# Patient Record
Sex: FEMALE | Race: WHITE | NOT HISPANIC OR LATINO | Employment: FULL TIME | URBAN - METROPOLITAN AREA
[De-identification: names, ages, dates, MRNs, and addresses within clinical notes are randomized per-mention and may not be internally consistent; named-entity substitution may affect disease eponyms.]

---

## 2021-07-29 PROCEDURE — 96374 THER/PROPH/DIAG INJ IV PUSH: CPT

## 2021-07-29 PROCEDURE — 96361 HYDRATE IV INFUSION ADD-ON: CPT

## 2021-07-29 PROCEDURE — 96375 TX/PRO/DX INJ NEW DRUG ADDON: CPT

## 2021-07-29 PROCEDURE — 99284 EMERGENCY DEPT VISIT MOD MDM: CPT | Mod: 25

## 2021-07-30 ENCOUNTER — HOSPITAL ENCOUNTER (EMERGENCY)
Facility: CLINIC | Age: 41
Discharge: HOME | End: 2021-07-30
Attending: EMERGENCY MEDICINE | Admitting: EMERGENCY MEDICINE
Payer: COMMERCIAL

## 2021-07-30 ENCOUNTER — APPOINTMENT (OUTPATIENT)
Dept: CT IMAGING | Facility: CLINIC | Age: 41
End: 2021-07-30
Attending: EMERGENCY MEDICINE
Payer: COMMERCIAL

## 2021-07-30 VITALS
RESPIRATION RATE: 16 BRPM | WEIGHT: 191 LBS | SYSTOLIC BLOOD PRESSURE: 145 MMHG | HEART RATE: 88 BPM | TEMPERATURE: 99 F | OXYGEN SATURATION: 97 % | DIASTOLIC BLOOD PRESSURE: 84 MMHG

## 2021-07-30 DIAGNOSIS — G43.001 MIGRAINE WITHOUT AURA AND WITH STATUS MIGRAINOSUS, NOT INTRACTABLE: ICD-10-CM

## 2021-07-30 LAB
ALBUMIN UR-MCNC: NEGATIVE MG/DL
APPEARANCE UR: CLEAR
BILIRUB UR QL STRIP: NEGATIVE
COLOR UR AUTO: ABNORMAL
GLUCOSE UR STRIP-MCNC: NEGATIVE MG/DL
HGB UR QL STRIP: NEGATIVE
KETONES UR STRIP-MCNC: 10 MG/DL
LEUKOCYTE ESTERASE UR QL STRIP: ABNORMAL
MUCOUS THREADS #/AREA URNS LPF: PRESENT /LPF
NITRATE UR QL: NEGATIVE
PH UR STRIP: 6.5 [PH] (ref 5–7)
RBC URINE: 0 /HPF
SP GR UR STRIP: 1.01 (ref 1–1.03)
SQUAMOUS EPITHELIAL: 12 /HPF
UROBILINOGEN UR STRIP-MCNC: <2 MG/DL
WBC URINE: 4 /HPF

## 2021-07-30 PROCEDURE — 70450 CT HEAD/BRAIN W/O DYE: CPT

## 2021-07-30 PROCEDURE — 81003 URINALYSIS AUTO W/O SCOPE: CPT | Performed by: EMERGENCY MEDICINE

## 2021-07-30 PROCEDURE — 258N000003 HC RX IP 258 OP 636: Performed by: EMERGENCY MEDICINE

## 2021-07-30 PROCEDURE — 250N000011 HC RX IP 250 OP 636: Performed by: EMERGENCY MEDICINE

## 2021-07-30 RX ORDER — KETOROLAC TROMETHAMINE 15 MG/ML
15 INJECTION, SOLUTION INTRAMUSCULAR; INTRAVENOUS ONCE
Status: COMPLETED | OUTPATIENT
Start: 2021-07-30 | End: 2021-07-30

## 2021-07-30 RX ORDER — DIPHENHYDRAMINE HYDROCHLORIDE 50 MG/ML
50 INJECTION INTRAMUSCULAR; INTRAVENOUS ONCE
Status: COMPLETED | OUTPATIENT
Start: 2021-07-30 | End: 2021-07-30

## 2021-07-30 RX ORDER — ONDANSETRON 4 MG/1
4-8 TABLET, ORALLY DISINTEGRATING ORAL EVERY 8 HOURS PRN
Qty: 20 TABLET | Refills: 0 | Status: SHIPPED | OUTPATIENT
Start: 2021-07-30 | End: 2021-08-02

## 2021-07-30 RX ADMIN — SODIUM CHLORIDE 1000 ML: 9 INJECTION, SOLUTION INTRAVENOUS at 00:36

## 2021-07-30 RX ADMIN — KETOROLAC TROMETHAMINE 15 MG: 15 INJECTION, SOLUTION INTRAMUSCULAR; INTRAVENOUS at 00:38

## 2021-07-30 RX ADMIN — PROCHLORPERAZINE EDISYLATE 10 MG: 5 INJECTION INTRAMUSCULAR; INTRAVENOUS at 00:40

## 2021-07-30 RX ADMIN — DIPHENHYDRAMINE HYDROCHLORIDE 50 MG: 50 INJECTION INTRAMUSCULAR; INTRAVENOUS at 00:39

## 2021-07-30 NOTE — ED TRIAGE NOTES
Pt presents with L sided migraine since Tuesday. Pt reports worsening symptoms since Tuesday. Pt has hx of migraines.  Pt reports nausea and light sensitivity. ABCs intact.

## 2021-07-30 NOTE — DISCHARGE INSTRUCTIONS
Follow-up with your primary care doctor within the next week for recheck  Follow-up with neurology in the next 1 to 2 weeks for further care and evaluation of your worsening headaches  Tylenol 650 mg every 4 hours as needed for pain  Ibuprofen 600 mg every 6 hours as needed for pain  Return to the emergency department for worsening problems or concerns

## 2021-07-30 NOTE — ED NOTES
Patient complains of migraine x 2 days with nausea. HX of migraine and used PRN imitrex without relief.

## 2021-07-30 NOTE — ED PROVIDER NOTES
EMERGENCY DEPARTMENT ENCOUnter      NAME: Bess Gabriel  AGE: 41 year old female  YOB: 1980  MRN: [id number]  EVALUATION DATE & TIME: 2021 12:04 AM    PCP: No primary care provider on file.    ED PROVIDER: Sunni Spangler MD      Chief Complaint   Patient presents with     Headache         FINAL IMPRESSION:  1. Migraine without aura and with status migrainosus, not intractable          ED COURSE & MEDICAL DECISION MAKIN Introduced myself to patient, gathered patient history, and performed an initial exam.  Normal saline 1 L IV was administered for IV hydration.  Toradol 15 mg, Benadryl 50 mg, and Compazine 10 mg IV was administered for treatment of her migraine  0208 Rechecked patient. Patient reports improvement in symptoms.  She has no evidence of intracranial abnormalities on her CT.  0218 Updated patient on her results. I discussed the plan for discharge with the patient, and patient is agreeable. We discussed supportive cares at home and reasons for return to the ER including new or worsening symptoms - all questions and concerns addressed. Patient to be discharged by RN.      At the conclusion of the encounter I discussed the results of all of the tests and the disposition. The questions were answered. The patient or family acknowledged understanding and was agreeable with the care plan.         MEDICATIONS GIVEN IN THE EMERGENCY:  Medications   0.9% sodium chloride BOLUS (0 mLs Intravenous Stopped 21 0215)   ketorolac (TORADOL) injection 15 mg (15 mg Intravenous Given 21 0038)   diphenhydrAMINE (BENADRYL) injection 50 mg (50 mg Intravenous Given 21 0039)   prochlorperazine (COMPAZINE) injection 10 mg (10 mg Intravenous Given 21 0040)       NEW PRESCRIPTIONS STARTED AT TODAY'S ER VISIT  New Prescriptions    No medications on file          =================================================================    HPI        Bess Gabriel is  a 41 year old female with a pertinent history of migraines (per patient) who presents to this ED by walk in for evaluation of headache. Patient presents with a migraine episode for the past 48 hours. Headache initially began on the right side of her face and has gradually wrapped itself around the right side of her head. Patient reports that she developed nausea, vomiting, and generalized weakness 8 hours ago. She states that she has been unable to hold anything down and as a consequence is dehydrated. Patient notes that she has history of migraines that usually dissipate in severity, but states that her current episode has not. She notes Imitrex, Tylenol, and Advil do not help with pain. She has not been seen by Neurology for this. Patient is otherwise healthy and is fully vaccinated against COVID-19.  Patient denies recent injuries, tick bites, sick exposures, fever, chills, cough, or any additional complaints at this time.      REVIEW OF SYSTEMS     Constitutional:  Denies fever or chills  HENT:  Denies sore throat   Respiratory:  Denies cough or shortness of breath   Cardiovascular:  Denies chest pain or palpitations  GI:  Denies abdominal pain. Endorses nausea and vomiting.   Musculoskeletal:  Denies any new extremity pain   Skin:  Denies rash   Neurologic:  Denies or sensory changes. Endorses headache and generalized weakness  All other systems reviewed and are negative      PAST MEDICAL HISTORY:  History reviewed. No pertinent past medical history.    PAST SURGICAL HISTORY:  History reviewed. No pertinent surgical history.        CURRENT MEDICATIONS:    No current outpatient medications on file.      ALLERGIES:  Allergies   Allergen Reactions     Amoxicillin Swelling       FAMILY HISTORY:  History reviewed. No pertinent family history.    SOCIAL HISTORY:   Social History     Socioeconomic History     Marital status:      Spouse name: None     Number of children: None     Years of education: None      Highest education level: None   Occupational History     None   Tobacco Use     Smoking status: None   Substance and Sexual Activity     Alcohol use: None     Drug use: None     Sexual activity: None   Other Topics Concern     None   Social History Narrative     None     Social Determinants of Health     Financial Resource Strain:      Difficulty of Paying Living Expenses:    Food Insecurity:      Worried About Running Out of Food in the Last Year:      Ran Out of Food in the Last Year:    Transportation Needs:      Lack of Transportation (Medical):      Lack of Transportation (Non-Medical):    Physical Activity:      Days of Exercise per Week:      Minutes of Exercise per Session:    Stress:      Feeling of Stress :    Social Connections:      Frequency of Communication with Friends and Family:      Frequency of Social Gatherings with Friends and Family:      Attends [employer] Services:      Active Member of Clubs or Organizations:      Attends Club or Organization Meetings:      Marital Status:    Intimate Partner Violence:      Fear of Current or Ex-Partner:      Emotionally Abused:      Physically Abused:      Sexually Abused:        VITALS:  Patient Vitals for the past 24 hrs:   BP Temp Temp src Pulse Resp SpO2 Weight   07/30/21 0033 (!) 142/89 -- -- 52 14 98 % --   07/29/21 2217 (!) 164/87 99  F (37.2  C) Oral 73 16 97 % 86.6 kg (191 lb)       PHYSICAL EXAM    Constitutional:  Well developed, Well nourished, mild distress  HENT:  Normocephalic, Atraumatic, Bilateral external ears normal, Oropharynx moist, Nose normal.   Neck:  Normal range of motion, No meningismus, No stridor.   Eyes:  EOMI, Conjunctiva normal, No discharge.   Respiratory:  Normal breath sounds, No respiratory distress, No wheezing, No chest tenderness.   Cardiovascular:  Normal heart rate, Normal rhythm, No murmurs  GI:  Soft, No tenderness, No guarding, No CVA tenderness.   Musculoskeletal:  Neurovascularly intact distally, No edema, No  tenderness, No cyanosis, Good range of motion in all major joints. No tenderness to palpation or major deformities noted.   Integument:  Warm, Dry, No erythema, No rash.   Lymphatic:  No lymphadenopathy noted.   Neurologic:  Alert & oriented x 3, Normal motor function, Normal sensory function, No focal deficits noted.   Psychiatric:  Affect normal, Judgment normal, Mood normal.      LAB:  Results for orders placed or performed during the hospital encounter of 07/30/21   Head CT w/o contrast    Impression    IMPRESSION:  1.  Normal head CT.   UA with Microscopic reflex to Culture    Specimen: Urine, Midstream   Result Value Ref Range    Color Urine Light Yellow Colorless, Straw, Light Yellow, Yellow    Appearance Urine Clear Clear    Glucose Urine Negative Negative mg/dL    Bilirubin Urine Negative Negative    Ketones Urine 10  (A) Negative mg/dL    Specific Gravity Urine 1.009 1.001 - 1.030    Blood Urine Negative Negative    pH Urine 6.5 5.0 - 7.0    Protein Albumin Urine Negative Negative mg/dL    Urobilinogen Urine <2.0 <2.0 mg/dL    Nitrite Urine Negative Negative    Leukocyte Esterase Urine 25 Dipesh/uL (A) Negative    Mucus Urine Present (A) None Seen /LPF    RBC Urine 0 <=2 /HPF    WBC Urine 4 <=5 /HPF    Squamous Epithelials Urine 12 (H) <=1 /HPF       RADIOLOGY:  Head CT w/o contrast    Result Date: 7/30/2021  EXAM: CT HEAD W/O CONTRAST LOCATION: [facility] DATE/TIME: 7/30/2021 1:28 AM INDICATION: Headache, classic migraine COMPARISON: None. TECHNIQUE: Routine CT Head without IV contrast. Multiplanar reformats. Dose reduction techniques were used. FINDINGS: INTRACRANIAL CONTENTS: No intracranial hemorrhage, extraaxial collection, or mass effect.  No CT evidence of acute infarct. Normal parenchymal attenuation. Normal ventricles and sulci. VISUALIZED ORBITS/SINUSES/MASTOIDS: No intraorbital abnormality. No paranasal sinus mucosal disease. No middle ear or mastoid effusion. BONES/SOFT  TISSUES: No acute abnormality.     IMPRESSION: 1.  Normal head CT.    I, Zi Bar, am serving as a scribe to document services personally performed by Dr. Spangler based on my observation and the provider's statements to me. I, Sunni Spangler MD attest that Zi Bar is acting in a scribe capacity, has observed my performance of the services and has documented them in accordance with my direction.    Sunni Spangler MD  Emergency Medicine  [facility] EMERGENCY ROOM  [address] [address]  [phone]  Dept: [phone]     Sunni Spangler MD  07/30/21 0021

## 2021-10-19 ENCOUNTER — OFFICE VISIT (OUTPATIENT)
Dept: NEUROLOGY | Facility: CLINIC | Age: 41
End: 2021-10-19
Attending: EMERGENCY MEDICINE
Payer: COMMERCIAL

## 2021-10-19 VITALS
HEART RATE: 54 BPM | DIASTOLIC BLOOD PRESSURE: 82 MMHG | SYSTOLIC BLOOD PRESSURE: 145 MMHG | HEIGHT: 62 IN | BODY MASS INDEX: 36.62 KG/M2 | WEIGHT: 199 LBS

## 2021-10-19 DIAGNOSIS — G43.709 CHRONIC MIGRAINE WITHOUT AURA WITHOUT STATUS MIGRAINOSUS, NOT INTRACTABLE: Primary | ICD-10-CM

## 2021-10-19 PROCEDURE — 99205 OFFICE O/P NEW HI 60 MIN: CPT | Performed by: PSYCHIATRY & NEUROLOGY

## 2021-10-19 RX ORDER — ONDANSETRON 8 MG/1
8 TABLET, ORALLY DISINTEGRATING ORAL PRN
COMMUNITY
Start: 2021-08-26

## 2021-10-19 RX ORDER — METOCLOPRAMIDE 5 MG/1
TABLET ORAL
Qty: 20 TABLET | Refills: 3 | Status: SHIPPED | OUTPATIENT
Start: 2021-10-19

## 2021-10-19 RX ORDER — PROPRANOLOL HCL 60 MG
60 CAPSULE, EXTENDED RELEASE 24HR ORAL DAILY
COMMUNITY
Start: 2021-08-26 | End: 2022-01-25

## 2021-10-19 RX ORDER — RIZATRIPTAN BENZOATE 10 MG/1
10 TABLET ORAL
Qty: 9 TABLET | Refills: 11 | Status: SHIPPED | OUTPATIENT
Start: 2021-10-19 | End: 2022-01-25

## 2021-10-19 RX ORDER — SUMATRIPTAN 20 MG/1
20 SPRAY NASAL PRN
COMMUNITY
Start: 2021-08-26 | End: 2021-10-19

## 2021-10-19 ASSESSMENT — MIFFLIN-ST. JEOR: SCORE: 1530.91

## 2021-10-19 NOTE — PROGRESS NOTES
In person evaluation    HPI  10/19/2021, in person consultation      41-year-old being evaluated neurologically for:  Migraine headaches  Onset around age 31    Frequency at least 6/month  Duration 4 hours to 3 days  A lot of pre headache type symptoms prior to the headache and afterwards with a hangover effect    Had 1 headache with significant nasal runny nose and tearing of the eye but that was only 1 time  Positive history of motion sickness and carsickness  Paternal aunt with migraine  Sister with migraine    Associated symptoms  Photophobia positive  Phonophobia positive  No visual changes  Nausea severe   Vomiting severe  Diarrhea associated with migraines  Headache onset unilateral above the eye either side  Throbbing painful headache builds in nature gradually    Has not used preventative medications yet was prescribed propranolol    Medications   Imitrex nasal spray and pills  Amerge  Zofran  Migraine cocktail at the ER        Past medical history  Severe migraine headaches onset age 31 after the birth of her first child  Hypertension  Anxiety/depression 2008      Habits  Past smoker  Alcohol 1/week    Family history  Grandfather with stroke and heart disease and diabetes  Maternal aunt with migraines  Cousins with migraines  Sister with migraines  Mother with high blood pressure high cholesterol and arthritis  Father with high cholesterol and arthritis    Work-up  TSH 0.72 (August 2, 2021)  Hemoglobin A1c 5.1% (8/2/2021)  CT scan head 7/30/2021 normal      Exam    Review of systems  Does have some back pain  Some difficulty with sleeping  Has had palpitations    Currently  No diplopia dysarthria dysphagia  No visual changes  No focal weakness numbness or tingling    When she has her headaches has symptoms and signs as outlined above  Severe nausea and vomiting with headaches  Photophobia phonophobia with headache    Otherwise review systems negative also see intake sheet    General exam  Blood pressure  145/82, pulse 54, temperature 99.0  HEENT normal  Lungs clear  Heart rate regular  Abdomen soft  Symmetrical pulses  No edema feet    Neurologic exam  Alert oriented 3  Normal prosody speech  Normal naming  Normal comprehension  Normal repetition  No aphasia  No neglect  Memory recall normal    Cranial 2 through 12 normal  Optic fundi good no papilledema  Pupils equal round reactive to light  No ophthalmoplegia  No nystagmus  Visual fields intact  Face symmetrical  Tongue twisters good    Upper extremities  No drift no tremor normal finger-nose    Lower extremities  Distal proximal strength good    Gait  Normal    Assessment/plan    1.  Severe migraine headaches        Frequency at least 6/month       Duration 4 hours to 3 days       Severe associated symptoms, intractable nausea vomiting photophobia and phonophobia no significant visual changes       Rare single headache with nasal running and tearing       Positive family history of migraines in sister and maternal aunt       History of motion sickness    2.  Hypertension    Discussed the difference between abortive and preventative medications  Discussed the side effects and benefits of different meds  She maybe has used Imitrex nasal spray and tablets and possibly Amerge    Due to the severity of her headaches with severe nausea vomiting and diarrhea difficulty use medications to break the headache if they get into late needs to be admitted to medication  Also discussed good headache hygiene with proper nutrition hydration sleep cycle and avoiding triggers  Not planning on a pregnancy    Plan  Propranolol 60 mg LA 1 tablet at nighttime primary as prescribed she has not started yet recommend trying it may take 6 weeks before it starts to work  Maxalt 10 mg tablet as the abortive therapy has not tried this may not work right away needs to be on a preventative  Reglan 5 mg tablet rescue tablet taken earlier in the headache down should not drive to decrease the  potential for gastroparesis with nausea and vomiting which prevents her medicines from working    Follow-up in 3 months    62 minutes total care time today discussing and evaluating the above    As part of the work-up today  Reviewed prior MD note 8/26/2021  Reviewed ER note 7/30/2021  Reviewed laboratory data  Reviewed CT scan head July 2021

## 2021-10-19 NOTE — LETTER
10/19/2021         RE: Bess Gabriel  [address] [address] [address]        Dear Colleague,    Thank you for referring your patient, Bess Gabriel, to the [facility] [place]. Please see a copy of my visit note below.    In person evaluation    HPI  10/19/2021, in person consultation      41-year-old being evaluated neurologically for:  Migraine headaches  Onset around age 31    Frequency at least 6/month  Duration 4 hours to 3 days  A lot of pre headache type symptoms prior to the headache and afterwards with a hangover effect    Had 1 headache with significant nasal runny nose and tearing of the eye but that was only 1 time  Positive history of motion sickness and carsickness  Paternal aunt with migraine  Sister with migraine    Associated symptoms  Photophobia positive  Phonophobia positive  No visual changes  Nausea severe   Vomiting severe  Diarrhea associated with migraines  Headache onset unilateral above the eye either side  Throbbing painful headache builds in nature gradually    Has not used preventative medications yet was prescribed propranolol    Medications   Imitrex nasal spray and pills  Amerge  Zofran  Migraine cocktail at the ER        Past medical history  Severe migraine headaches onset age 31 after the birth of her first child  Hypertension  Anxiety/depression 2008      Habits  Past smoker  Alcohol 1/week    Family history  Grandfather with stroke and heart disease and diabetes  Maternal aunt with migraines  Cousins with migraines  Sister with migraines  Mother with high blood pressure high cholesterol and arthritis  Father with high cholesterol and arthritis    Work-up  TSH 0.72 (August 2, 2021)  Hemoglobin A1c 5.1% (8/2/2021)  CT scan head 7/30/2021 normal      Exam    Review of systems  Does have some back pain  Some difficulty with sleeping  Has had palpitations    Currently  No diplopia dysarthria dysphagia  No visual changes  No  focal weakness numbness or tingling    When she has her headaches has symptoms and signs as outlined above  Severe nausea and vomiting with headaches  Photophobia phonophobia with headache    Otherwise review systems negative also see intake sheet    General exam  Blood pressure 145/82, pulse 54, temperature 99.0  HEENT normal  Lungs clear  Heart rate regular  Abdomen soft  Symmetrical pulses  No edema feet    Neurologic exam  Alert oriented 3  Normal prosody speech  Normal naming  Normal comprehension  Normal repetition  No aphasia  No neglect  Memory recall normal    Cranial 2 through 12 normal  Optic fundi good no papilledema  Pupils equal round reactive to light  No ophthalmoplegia  No nystagmus  Visual fields intact  Face symmetrical  Tongue twisters good    Upper extremities  No drift no tremor normal finger-nose    Lower extremities  Distal proximal strength good    Gait  Normal    Assessment/plan    1.  Severe migraine headaches        Frequency at least 6/month       Duration 4 hours to 3 days       Severe associated symptoms, intractable nausea vomiting photophobia and phonophobia no significant visual changes       Rare single headache with nasal running and tearing       Positive family history of migraines in sister and maternal aunt       History of motion sickness    2.  Hypertension    Discussed the difference between abortive and preventative medications  Discussed the side effects and benefits of different meds  She maybe has used Imitrex nasal spray and tablets and possibly Amerge    Due to the severity of her headaches with severe nausea vomiting and diarrhea difficulty use medications to break the headache if they get into late needs to be admitted to medication  Also discussed good headache hygiene with proper nutrition hydration sleep cycle and avoiding triggers  Not planning on a pregnancy    Plan  Propranolol 60 mg LA 1 tablet at nighttime primary as prescribed she has not started yet  recommend trying it may take 6 weeks before it starts to work  Maxalt 10 mg tablet as the abortive therapy has not tried this may not work right away needs to be on a preventative  Reglan 5 mg tablet rescue tablet taken earlier in the headache down should not drive to decrease the potential for gastroparesis with nausea and vomiting which prevents her medicines from working    Follow-up in 3 months    62 minutes total care time today discussing and evaluating the above    As part of the work-up today  Reviewed prior MD note 8/26/2021  Reviewed ER note 7/30/2021  Reviewed laboratory data  Reviewed CT scan head July 2021        Again, thank you for allowing me to participate in the care of your patient.        Sincerely,        Jose Angel Garcia MD

## 2021-10-19 NOTE — NURSING NOTE
Chief Complaint   Patient presents with     Migraine     Pt has had mmigraines since she was in her 20's. Migraines have now worsened. Averaging 1-2 per month. Sumatriptan does not help.     Ivon Kidd LPN on 10/19/2021 at 10:15 AM

## 2022-01-25 ENCOUNTER — OFFICE VISIT (OUTPATIENT)
Dept: NEUROLOGY | Facility: CLINIC | Age: 42
End: 2022-01-25
Payer: COMMERCIAL

## 2022-01-25 VITALS
DIASTOLIC BLOOD PRESSURE: 78 MMHG | HEIGHT: 62 IN | SYSTOLIC BLOOD PRESSURE: 126 MMHG | HEART RATE: 67 BPM | BODY MASS INDEX: 36.07 KG/M2 | WEIGHT: 196 LBS

## 2022-01-25 DIAGNOSIS — G43.709 CHRONIC MIGRAINE WITHOUT AURA WITHOUT STATUS MIGRAINOSUS, NOT INTRACTABLE: Primary | ICD-10-CM

## 2022-01-25 PROCEDURE — 99214 OFFICE O/P EST MOD 30 MIN: CPT | Performed by: PSYCHIATRY & NEUROLOGY

## 2022-01-25 RX ORDER — RIZATRIPTAN BENZOATE 10 MG/1
10 TABLET ORAL
Qty: 9 TABLET | Refills: 11 | Status: SHIPPED | OUTPATIENT
Start: 2022-01-25

## 2022-01-25 RX ORDER — PROPRANOLOL HCL 60 MG
60 CAPSULE, EXTENDED RELEASE 24HR ORAL DAILY
Qty: 30 CAPSULE | Refills: 11 | Status: SHIPPED | OUTPATIENT
Start: 2022-01-25

## 2022-01-25 ASSESSMENT — MIFFLIN-ST. JEOR: SCORE: 1517.3

## 2022-01-25 NOTE — LETTER
"    1/25/2022         RE: Bess Gabriel  [id number] [facility] [address]        Dear Colleague,    Thank you for referring your patient, Bess Gabriel, to the [facility] [place]. Please see a copy of my visit note below.    In person evaluation    HPI  10/19/2021, in person consultation  1/25/2022, in person visit      41-year-old being evaluated neurologically for:  Migraine headaches  Onset around age 31      A.  Migraine headaches       Onset of migraines around age 31        Headache onset unilateral above the eye either side        Throbbing painful headache builds in nature gradually    Frequency of headache  1/month or every other month  Duration 1-2-3 days if you count the presymptoms and \"hangover effect\"  Significant nausea and dry heaves with a headache    Patient did not try the propranolol as her blood pressure got better with some exercise  Has not tried the Reglan either yet  Did find the Maxalt helped a lot    Rediscussed side effects and benefits of propranolol dosing.  Rediscussed side effects and benefits of Reglan we will try a half a tablet for the nausea  Discussed how the preventative medication may make the headaches last less long and be less severe  Did discuss that there are other preventative meds but they all have significant side effects listed but are not always experienced by every patient  Patient is fairly holistic and tries to do things without medications    After significant discussion she is going to try the propranolol and half of a tablet of Reglan if needed for the nausea  She finds the Maxalt works but it seems to be fairly potent she has to wait out           Associated symptoms       Photophobia positive      Phonophobia positive       No visual changes      Nausea severe        Vomiting severe      Diarrhea associated with migraines     Headache onset unilateral above the eye either side     Throbbing painful " headache builds in nature gradually        Past migraine review October 19, 2021  Frequency at least 6/month  Duration 4 hours to 3 days  A lot of pre headache type symptoms prior to the headache and afterwards with a hangover effect    Had 1 headache with significant nasal runny nose and tearing of the eye but that was only 1 time  Positive history of motion sickness and carsickness  Paternal aunt with migraine  Sister with migraine    Associated symptoms  Photophobia positive  Phonophobia positive  No visual changes  Nausea severe   Vomiting severe  Diarrhea associated with migraines  Headache onset unilateral above the eye either side  Throbbing painful headache builds in nature gradually    Has not used preventative medications yet was prescribed propranolol    Medications   Imitrex nasal spray and pills  Amerge  Zofran  Migraine cocktail at the ER        Past medical history  Severe migraine headaches onset age 31 after the birth of her first child  Hypertension  Anxiety/depression 2008      Habits  Past smoker  Alcohol 1/week    Family history  Grandfather with stroke and heart disease and diabetes  Maternal aunt with migraines  Cousins with migraines  Sister with migraines  Mother with high blood pressure high cholesterol and arthritis  Father with high cholesterol and arthritis    Work-up  TSH 0.72 (August 2, 2021)  Hemoglobin A1c 5.1% (8/2/2021)  CT scan head 7/30/2021 normal      Exam    Review of systems  Does have some back pain  Some difficulty with sleeping  Has had palpitations    Currently  No diplopia dysarthria dysphagia  No visual changes  No focal weakness numbness or tingling    When she has her headaches has symptoms and signs as outlined above  Severe nausea and vomiting with headaches  Photophobia phonophobia with headache    Otherwise review systems negative also see intake sheet    General exam  Blood pressure 126/78, pulse 67, temperature 99.0  HEENT normal  Lungs clear  Heart rate  regular  Abdomen soft  Symmetrical pulses  No edema feet    Neurologic exam  Alert oriented 3  Normal prosody speech  Normal naming  Normal comprehension  Normal repetition  No aphasia  No neglect  Memory recall normal    Cranial 2 through 12 normal  Optic fundi good no papilledema  Pupils equal round reactive to light  No ophthalmoplegia  No nystagmus  Visual fields intact  Face symmetrical  Tongue twisters good    Upper extremities  No drift no tremor normal finger-nose    Lower extremities  Distal proximal strength good    Gait  Normal    Assessment/plan    1.  Severe migraine headaches        Frequency at least 6/month       Duration 4 hours to 3 days       Severe associated symptoms, intractable nausea vomiting photophobia and phonophobia no significant visual changes       Rare single headache with nasal running and tearing       Positive family history of migraines in sister and maternal aunt       History of motion sickness    2.  Hypertension    Discussed the difference between abortive and preventative medications  Discussed the side effects and benefits of different meds  She maybe has used Imitrex nasal spray and tablets and possibly Amerge    Due to the severity of her headaches with severe nausea vomiting and diarrhea difficulty use medications to break the headache if they get into late needs to be admitted to medication  Also discussed good headache hygiene with proper nutrition hydration sleep cycle and avoiding triggers  Not planning on a pregnancy    Rediscussed side effects and benefits of medication  Discussed the difference between abortive and preventative medications    Patient will try the propranolol  Follow-up in 3 months  Use a half of the Reglan if needed  She will watch for any side effects  Does find the Maxalt does help but sometimes a headache syndrome can last for 3 days once per month  Seems to track with her menstrual cycle  Discussed other medications but would prefer to try this  first before we moved to another one      Medications  Propranolol 60 mg LA 1 tablet at nighttime primary as prescribed she has not started yet recommend trying it may take 6 weeks before it starts to work  Maxalt 10 mg tablet as the abortive therapy has not tried this may not work right away needs to be on a preventative  Reglan 5 mg tablet rescue tablet taken earlier in the headache down should not drive to decrease the potential for gastroparesis with nausea and vomiting which prevents her medicines from working      32 minutes care time today which was counseling and discussion about medications and treatment of migraine        Again, thank you for allowing me to participate in the care of your patient.        Sincerely,        Jose Angel Garcia MD

## 2022-01-25 NOTE — NURSING NOTE
Chief Complaint   Patient presents with     Migraine     3 month follow up. Pt states migraines have improved. Pt states she is able to manage them better.       Ivon Kidd LPN on 1/25/2022 at 10:01 AM

## 2022-01-25 NOTE — PROGRESS NOTES
"In person evaluation    HPI  10/19/2021, in person consultation  1/25/2022, in person visit      41-year-old being evaluated neurologically for:  Migraine headaches  Onset around age 31      A.  Migraine headaches       Onset of migraines around age 31        Headache onset unilateral above the eye either side        Throbbing painful headache builds in nature gradually    Frequency of headache  1/month or every other month  Duration 1-2-3 days if you count the presymptoms and \"hangover effect\"  Significant nausea and dry heaves with a headache    Patient did not try the propranolol as her blood pressure got better with some exercise  Has not tried the Reglan either yet  Did find the Maxalt helped a lot    Rediscussed side effects and benefits of propranolol dosing.  Rediscussed side effects and benefits of Reglan we will try a half a tablet for the nausea  Discussed how the preventative medication may make the headaches last less long and be less severe  Did discuss that there are other preventative meds but they all have significant side effects listed but are not always experienced by every patient  Patient is fairly holistic and tries to do things without medications    After significant discussion she is going to try the propranolol and half of a tablet of Reglan if needed for the nausea  She finds the Maxalt works but it seems to be fairly potent she has to wait out           Associated symptoms       Photophobia positive      Phonophobia positive       No visual changes      Nausea severe        Vomiting severe      Diarrhea associated with migraines     Headache onset unilateral above the eye either side     Throbbing painful headache builds in nature gradually        Past migraine review October 19, 2021  Frequency at least 6/month  Duration 4 hours to 3 days  A lot of pre headache type symptoms prior to the headache and afterwards with a hangover effect    Had 1 headache with significant nasal runny nose " and tearing of the eye but that was only 1 time  Positive history of motion sickness and carsickness  Paternal aunt with migraine  Sister with migraine    Associated symptoms  Photophobia positive  Phonophobia positive  No visual changes  Nausea severe   Vomiting severe  Diarrhea associated with migraines  Headache onset unilateral above the eye either side  Throbbing painful headache builds in nature gradually    Has not used preventative medications yet was prescribed propranolol    Medications   Imitrex nasal spray and pills  Amerge  Zofran  Migraine cocktail at the ER        Past medical history  Severe migraine headaches onset age 31 after the birth of her first child  Hypertension  Anxiety/depression 2008      Habits  Past smoker  Alcohol 1/week    Family history  Grandfather with stroke and heart disease and diabetes  Maternal aunt with migraines  Cousins with migraines  Sister with migraines  Mother with high blood pressure high cholesterol and arthritis  Father with high cholesterol and arthritis    Work-up  TSH 0.72 (August 2, 2021)  Hemoglobin A1c 5.1% (8/2/2021)  CT scan head 7/30/2021 normal      Exam    Review of systems  Does have some back pain  Some difficulty with sleeping  Has had palpitations    Currently  No diplopia dysarthria dysphagia  No visual changes  No focal weakness numbness or tingling    When she has her headaches has symptoms and signs as outlined above  Severe nausea and vomiting with headaches  Photophobia phonophobia with headache    Otherwise review systems negative also see intake sheet    General exam  Blood pressure 126/78, pulse 67, temperature 99.0  HEENT normal  Lungs clear  Heart rate regular  Abdomen soft  Symmetrical pulses  No edema feet    Neurologic exam  Alert oriented 3  Normal prosody speech  Normal naming  Normal comprehension  Normal repetition  No aphasia  No neglect  Memory recall normal    Cranial 2 through 12 normal  Optic fundi good no papilledema  Pupils  equal round reactive to light  No ophthalmoplegia  No nystagmus  Visual fields intact  Face symmetrical  Tongue twisters good    Upper extremities  No drift no tremor normal finger-nose    Lower extremities  Distal proximal strength good    Gait  Normal    Assessment/plan    1.  Severe migraine headaches        Frequency at least 6/month       Duration 4 hours to 3 days       Severe associated symptoms, intractable nausea vomiting photophobia and phonophobia no significant visual changes       Rare single headache with nasal running and tearing       Positive family history of migraines in sister and maternal aunt       History of motion sickness    2.  Hypertension    Discussed the difference between abortive and preventative medications  Discussed the side effects and benefits of different meds  She maybe has used Imitrex nasal spray and tablets and possibly Amerge    Due to the severity of her headaches with severe nausea vomiting and diarrhea difficulty use medications to break the headache if they get into late needs to be admitted to medication  Also discussed good headache hygiene with proper nutrition hydration sleep cycle and avoiding triggers  Not planning on a pregnancy    Rediscussed side effects and benefits of medication  Discussed the difference between abortive and preventative medications    Patient will try the propranolol  Follow-up in 3 months  Use a half of the Reglan if needed  She will watch for any side effects  Does find the Maxalt does help but sometimes a headache syndrome can last for 3 days once per month  Seems to track with her menstrual cycle  Discussed other medications but would prefer to try this first before we moved to another one      Medications  Propranolol 60 mg LA 1 tablet at nighttime primary as prescribed she has not started yet recommend trying it may take 6 weeks before it starts to work  Maxalt 10 mg tablet as the abortive therapy has not tried this may not work right  away needs to be on a preventative  Reglan 5 mg tablet rescue tablet taken earlier in the headache down should not drive to decrease the potential for gastroparesis with nausea and vomiting which prevents her medicines from working      32 minutes care time today which was counseling and discussion about medications and treatment of migraine

## 2022-04-22 ENCOUNTER — OFFICE VISIT (OUTPATIENT)
Dept: NEUROLOGY | Facility: CLINIC | Age: 42
End: 2022-04-22
Payer: COMMERCIAL

## 2022-04-22 VITALS
DIASTOLIC BLOOD PRESSURE: 82 MMHG | BODY MASS INDEX: 36.07 KG/M2 | HEART RATE: 62 BPM | HEIGHT: 62 IN | SYSTOLIC BLOOD PRESSURE: 136 MMHG | WEIGHT: 196 LBS

## 2022-04-22 DIAGNOSIS — G43.709 CHRONIC MIGRAINE WITHOUT AURA WITHOUT STATUS MIGRAINOSUS, NOT INTRACTABLE: Primary | ICD-10-CM

## 2022-04-22 PROCEDURE — 99213 OFFICE O/P EST LOW 20 MIN: CPT | Performed by: PSYCHIATRY & NEUROLOGY

## 2022-04-22 RX ORDER — NORTRIPTYLINE HCL 10 MG
10 CAPSULE ORAL AT BEDTIME
Qty: 30 CAPSULE | Refills: 11 | Status: SHIPPED | OUTPATIENT
Start: 2022-04-22

## 2022-04-22 NOTE — LETTER
"    4/22/2022         RE: Bess Gabriel  [id number] [facility] [id number]        Dear Colleague,    Thank you for referring your patient, Bess Gabriel, to the [facility] [place]. Please see a copy of my visit note below.    In person evaluation    HPI  10/19/2021, in person consultation  1/25/2022, in person visit  4/22/2022, in person visit      41-year-old being evaluated neurologically for:  Migraine headaches  Onset around age 31      A.  Migraine headaches       Onset of migraines around age 31        Headache onset unilateral above the eye either side        Throbbing painful headache builds in nature gradually                                            10/19/2021    1/25/2022    4/22/2022  Frequency of headache     6/month      1/month        2/month  Duration                            1-3 days       1-3 days      1-3 days        Duration 1-2-3 days if you count the presymptoms and \"hangover effect\"  Significant nausea and dry heaves with a headache    Metoclopramide/Maxalt does help when she takes them    Blood pressure range 118//92    Has not tried the Reglan either yet  Did find the Maxalt helped a lot    Patient is tracking her menstrual cycle with an tino as she feels there is some relationship  Discussed that I do not feel that removing her ovaries would be a good idea and may not necessarily decrease the frequency and severity of her headaches enough to warrant the risk.    Discussed adding a serotonin medication nortriptyline    Rediscussed side effects and benefits of propranolol dosing.  Rediscussed side effects and benefits of Reglan we will try a half a tablet for the nausea  Discussed how the preventative medication may make the headaches last less long and be less severe  Did discuss that there are other preventative meds but they all have significant side effects listed but are not always experienced by every patient  Patient is " fairly holistic and tries to do things without medications    After significant discussion she is going to try the propranolol and half of a tablet of Reglan if needed for the nausea  She finds the Maxalt works but it seems to be fairly potent she has to wait out           Associated symptoms       Photophobia positive      Phonophobia positive       No visual changes      Nausea severe        Vomiting severe      Diarrhea associated with migraines     Headache onset unilateral above the eye either side     Throbbing painful headache builds in nature gradually      B.   Hypertension        Better on propranolol        Blood pressure ranges at home 118//92          higher numbers are more so she has a headache          Past migraine review October 19, 2021  Frequency at least 6/month  Duration 4 hours to 3 days  A lot of pre headache type symptoms prior to the headache and afterwards with a hangover effect    Had 1 headache with significant nasal runny nose and tearing of the eye but that was only 1 time  Positive history of motion sickness and carsickness  Paternal aunt with migraine  Sister with migraine    Associated symptoms  Photophobia positive  Phonophobia positive  No visual changes  Nausea severe   Vomiting severe  Diarrhea associated with migraines  Headache onset unilateral above the eye either side  Throbbing painful headache builds in nature gradually    Has not used preventative medications yet was prescribed propranolol    Medications   Imitrex nasal spray and pills  Amerge  Zofran  Migraine cocktail at the ER        Past medical history  Severe migraine headaches onset age 31 after the birth of her first child  Hypertension  Anxiety/depression 2008      Habits  Past smoker  Alcohol 1/week    Family history  Grandfather with stroke and heart disease and diabetes  Maternal aunt with migraines  Cousins with migraines  Sister with migraines  Mother with high blood pressure high cholesterol and  arthritis  Father with high cholesterol and arthritis    Work-up  TSH 0.72 (August 2, 2021)  Hemoglobin A1c 5.1% (8/2/2021)  CT scan head 7/30/2021 normal      Exam    Review of systems  Does have some back pain  Some difficulty with sleeping  Has had palpitations    Currently  No diplopia dysarthria dysphagia  No visual changes  No focal weakness numbness or tingling    When she has her headaches has symptoms and signs as outlined above  Severe nausea and vomiting with headaches  Photophobia phonophobia with headache    Otherwise review systems negative also see intake sheet    General exam  Blood pressure 136/82, pulse 62  HEENT normal  Lungs clear  Heart rate regular  Abdomen soft  Symmetrical pulses  No edema feet    Neurologic exam  Alert oriented 3  Normal prosody speech  Normal naming  Normal comprehension  Normal repetition  No aphasia  No neglect  Memory recall normal    Cranial 2 through 12 normal  Optic fundi good no papilledema  Pupils equal round reactive to light  No ophthalmoplegia  No nystagmus  Visual fields intact  Face symmetrical  Tongue twisters good    Upper extremities  No drift no tremor normal finger-nose    Lower extremities  Distal proximal strength good    Gait  Normal    Assessment/plan    1.  Severe migraine headaches        Frequency at least 6/month       Duration 4 hours to 3 days       Severe associated symptoms, intractable nausea vomiting photophobia and phonophobia no significant visual changes       Rare single headache with nasal running and tearing       Positive family history of migraines in sister and maternal aunt       History of motion sickness    2.  Hypertension    Discussed the difference between abortive and preventative medications  Discussed the side effects and benefits of different meds  She maybe has used Imitrex nasal spray and tablets and possibly Amerge    Due to the severity of her headaches with severe nausea vomiting and diarrhea difficulty use medications  to break the headache if they get into late needs to be admitted to medication  Also discussed good headache hygiene with proper nutrition hydration sleep cycle and avoiding triggers  Not planning on a pregnancy    Rediscussed side effects and benefits of medication  Discussed the difference between abortive and preventative medications    Propranolol helped some for her blood pressure but not her headaches  Metoclopramide/Maxalt helps for the headache    Seems to track with her menstrual cycle    Add nortriptyline  10 mg 1 p.o. nightly  If not helping after 4 weeks can call and we can slowly increase    We discussed medical management of of significant migraine headaches  Also reviewed criteria for Botox and other treatments    Patient will follow-up in 3 months    28 minutes total care time today discussing and evaluating the above.            Again, thank you for allowing me to participate in the care of your patient.        Sincerely,        Jose Angel Garcia MD

## 2022-04-22 NOTE — PROGRESS NOTES
"In person evaluation    HPI  10/19/2021, in person consultation  1/25/2022, in person visit  4/22/2022, in person visit      41-year-old being evaluated neurologically for:  Migraine headaches  Onset around age 31      A.  Migraine headaches       Onset of migraines around age 31        Headache onset unilateral above the eye either side        Throbbing painful headache builds in nature gradually                                            10/19/2021    1/25/2022    4/22/2022  Frequency of headache     6/month      1/month        2/month  Duration                            1-3 days       1-3 days      1-3 days        Duration 1-2-3 days if you count the presymptoms and \"hangover effect\"  Significant nausea and dry heaves with a headache    Metoclopramide/Maxalt does help when she takes them    Blood pressure range 118//92    Has not tried the Reglan either yet  Did find the Maxalt helped a lot    Patient is tracking her menstrual cycle with an tino as she feels there is some relationship  Discussed that I do not feel that removing her ovaries would be a good idea and may not necessarily decrease the frequency and severity of her headaches enough to warrant the risk.    Discussed adding a serotonin medication nortriptyline    Rediscussed side effects and benefits of propranolol dosing.  Rediscussed side effects and benefits of Reglan we will try a half a tablet for the nausea  Discussed how the preventative medication may make the headaches last less long and be less severe  Did discuss that there are other preventative meds but they all have significant side effects listed but are not always experienced by every patient  Patient is fairly holistic and tries to do things without medications    After significant discussion she is going to try the propranolol and half of a tablet of Reglan if needed for the nausea  She finds the Maxalt works but it seems to be fairly potent she has to wait out           " Associated symptoms       Photophobia positive      Phonophobia positive       No visual changes      Nausea severe        Vomiting severe      Diarrhea associated with migraines     Headache onset unilateral above the eye either side     Throbbing painful headache builds in nature gradually      B.   Hypertension        Better on propranolol        Blood pressure ranges at home 118//92          higher numbers are more so she has a headache          Past migraine review October 19, 2021  Frequency at least 6/month  Duration 4 hours to 3 days  A lot of pre headache type symptoms prior to the headache and afterwards with a hangover effect    Had 1 headache with significant nasal runny nose and tearing of the eye but that was only 1 time  Positive history of motion sickness and carsickness  Paternal aunt with migraine  Sister with migraine    Associated symptoms  Photophobia positive  Phonophobia positive  No visual changes  Nausea severe   Vomiting severe  Diarrhea associated with migraines  Headache onset unilateral above the eye either side  Throbbing painful headache builds in nature gradually    Has not used preventative medications yet was prescribed propranolol    Medications   Imitrex nasal spray and pills  Amerge  Zofran  Migraine cocktail at the ER        Past medical history  Severe migraine headaches onset age 31 after the birth of her first child  Hypertension  Anxiety/depression 2008      Habits  Past smoker  Alcohol 1/week    Family history  Grandfather with stroke and heart disease and diabetes  Maternal aunt with migraines  Cousins with migraines  Sister with migraines  Mother with high blood pressure high cholesterol and arthritis  Father with high cholesterol and arthritis    Work-up  TSH 0.72 (August 2, 2021)  Hemoglobin A1c 5.1% (8/2/2021)  CT scan head 7/30/2021 normal      Exam    Review of systems  Does have some back pain  Some difficulty with sleeping  Has had  palpitations    Currently  No diplopia dysarthria dysphagia  No visual changes  No focal weakness numbness or tingling    When she has her headaches has symptoms and signs as outlined above  Severe nausea and vomiting with headaches  Photophobia phonophobia with headache    Otherwise review systems negative also see intake sheet    General exam  Blood pressure 136/82, pulse 62  HEENT normal  Lungs clear  Heart rate regular  Abdomen soft  Symmetrical pulses  No edema feet    Neurologic exam  Alert oriented 3  Normal prosody speech  Normal naming  Normal comprehension  Normal repetition  No aphasia  No neglect  Memory recall normal    Cranial 2 through 12 normal  Optic fundi good no papilledema  Pupils equal round reactive to light  No ophthalmoplegia  No nystagmus  Visual fields intact  Face symmetrical  Tongue twisters good    Upper extremities  No drift no tremor normal finger-nose    Lower extremities  Distal proximal strength good    Gait  Normal    Assessment/plan    1.  Severe migraine headaches        Frequency at least 6/month       Duration 4 hours to 3 days       Severe associated symptoms, intractable nausea vomiting photophobia and phonophobia no significant visual changes       Rare single headache with nasal running and tearing       Positive family history of migraines in sister and maternal aunt       History of motion sickness    2.  Hypertension    Discussed the difference between abortive and preventative medications  Discussed the side effects and benefits of different meds  She maybe has used Imitrex nasal spray and tablets and possibly Amerge    Due to the severity of her headaches with severe nausea vomiting and diarrhea difficulty use medications to break the headache if they get into late needs to be admitted to medication  Also discussed good headache hygiene with proper nutrition hydration sleep cycle and avoiding triggers  Not planning on a pregnancy    Rediscussed side effects and  benefits of medication  Discussed the difference between abortive and preventative medications    Propranolol helped some for her blood pressure but not her headaches  Metoclopramide/Maxalt helps for the headache    Seems to track with her menstrual cycle    Add nortriptyline  10 mg 1 p.o. nightly  If not helping after 4 weeks can call and we can slowly increase    We discussed medical management of of significant migraine headaches  Also reviewed criteria for Botox and other treatments    Patient will follow-up in 3 months    28 minutes total care time today discussing and evaluating the above.

## 2022-04-22 NOTE — NURSING NOTE
Chief Complaint   Patient presents with     Migraine     3 month follow up. Pt started propranol, no help. Rizatriptan and metoclopramide have helped.     Ivon Kidd LPN on 4/22/2022 at 9:36 AM

## 2023-08-15 ENCOUNTER — APPOINTMENT (OUTPATIENT)
Dept: RADIOLOGY | Facility: CLINIC | Age: 43
End: 2023-08-15
Attending: EMERGENCY MEDICINE
Payer: COMMERCIAL

## 2023-08-15 ENCOUNTER — APPOINTMENT (OUTPATIENT)
Dept: CT IMAGING | Facility: CLINIC | Age: 43
End: 2023-08-15
Attending: EMERGENCY MEDICINE
Payer: COMMERCIAL

## 2023-08-15 ENCOUNTER — HOSPITAL ENCOUNTER (EMERGENCY)
Facility: CLINIC | Age: 43
Discharge: HOME | End: 2023-08-15
Attending: EMERGENCY MEDICINE | Admitting: EMERGENCY MEDICINE
Payer: COMMERCIAL

## 2023-08-15 VITALS
HEIGHT: 62 IN | DIASTOLIC BLOOD PRESSURE: 94 MMHG | SYSTOLIC BLOOD PRESSURE: 187 MMHG | RESPIRATION RATE: 18 BRPM | BODY MASS INDEX: 32.2 KG/M2 | WEIGHT: 175 LBS | HEART RATE: 58 BPM | TEMPERATURE: 98.1 F | OXYGEN SATURATION: 98 %

## 2023-08-15 DIAGNOSIS — S16.1XXA CERVICAL STRAIN, INITIAL ENCOUNTER: ICD-10-CM
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
## 2023-08-15 PROBLEM — M54.31 BILATERAL SCIATICA: Status: ACTIVE | Noted: 2023-08-15

## 2023-08-15 PROBLEM — F41.0 PANIC DISORDER: Status: ACTIVE | Noted: 2023-08-15

## 2023-08-15 PROBLEM — K21.9 GERD (GASTROESOPHAGEAL REFLUX DISEASE): Status: ACTIVE | Noted: 2023-08-15

## 2023-08-15 PROBLEM — K44.9 HIATAL HERNIA: Status: ACTIVE | Noted: 2023-08-15

## 2023-08-15 PROBLEM — M54.32 BILATERAL SCIATICA: Status: ACTIVE | Noted: 2023-08-15

## 2023-08-15 PROBLEM — R87.619 ABNORMAL PAP SMEAR OF CERVIX: Status: ACTIVE | Noted: 2023-08-15

## 2023-08-15 PROBLEM — N83.209 OVARIAN CYST: Status: ACTIVE | Noted: 2023-08-15

## 2023-08-15 PROBLEM — G43.001 MIGRAINE WITHOUT AURA AND WITH STATUS MIGRAINOSUS, NOT INTRACTABLE: Status: ACTIVE | Noted: 2019-02-07

## 2023-08-15 PROCEDURE — 250N000013 HC RX MED GY IP 250 OP 250 PS 637: Performed by: EMERGENCY MEDICINE

## 2023-08-15 PROCEDURE — 73030 X-RAY EXAM OF SHOULDER: CPT | Mod: LT

## 2023-08-15 PROCEDURE — 99285 EMERGENCY DEPT VISIT HI MDM: CPT | Mod: 25

## 2023-08-15 PROCEDURE — 70450 CT HEAD/BRAIN W/O DYE: CPT

## 2023-08-15 PROCEDURE — 72125 CT NECK SPINE W/O DYE: CPT

## 2023-08-15 PROCEDURE — 71046 X-RAY EXAM CHEST 2 VIEWS: CPT

## 2023-08-15 RX ORDER — ACETAMINOPHEN 325 MG/1
975 TABLET ORAL ONCE
Status: COMPLETED | OUTPATIENT
Start: 2023-08-15 | End: 2023-08-15

## 2023-08-15 RX ORDER — CYCLOBENZAPRINE HCL 10 MG
10 TABLET ORAL 3 TIMES DAILY PRN
Qty: 15 TABLET | Refills: 0 | Status: SHIPPED | OUTPATIENT
Start: 2023-08-15 | End: 2023-08-20

## 2023-08-15 RX ORDER — CYCLOBENZAPRINE HCL 10 MG
10 TABLET ORAL ONCE
Status: COMPLETED | OUTPATIENT
Start: 2023-08-15 | End: 2023-08-15

## 2023-08-15 RX ADMIN — ACETAMINOPHEN 975 MG: 325 TABLET ORAL at 20:48

## 2023-08-15 RX ADMIN — CYCLOBENZAPRINE 10 MG: 10 TABLET, FILM COATED ORAL at 20:48

## 2023-08-15 ASSESSMENT — ACTIVITIES OF DAILY LIVING (ADL): ADLS_ACUITY_SCORE: 35

## 2023-08-15 NOTE — Clinical Note
Bess Gabriel was seen and treated in our emergency department on 8/15/2023.  She may return to work on 08/19/2023.       If you have any questions or concerns, please don't hesitate to call.      Leela Alejandro MD
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
